# Patient Record
(demographics unavailable — no encounter records)

---

## 2025-04-24 NOTE — HEALTH RISK ASSESSMENT
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

## 2025-04-24 NOTE — HISTORY OF PRESENT ILLNESS
[de-identified] : Patient comes for his comprehensive physical examination and followup of his chronic medical problems.Patient feels well without any chest pain, shortness of breath, paroxysmal nocturnal dyspnea, orthopnea or bleeding episodes

## 2025-04-24 NOTE — ASSESSMENT
[FreeTextEntry1] : Diagnoses #1 history of paroxysmal atrial fibrillation, stable.  Followed up by the cardiology. #2 hypertension, Continue same treatment and low salt diet.cmp to lab to check kidney function and electrolytes. #3 hyperlipidemia. Continue with low-fat diet.lipids to lab #4 hypovitaminosis D., being supplemented.level to lab #5 benign prostatic hypertrophy.stable .psa to lab #6 obstructive sleep apnea. Patient refused to use mask because he cannot tolerate it during  his sleep.  Advised to try to lose weight by cutting back on his calories # 7 gastritis GE reflux, stable.cbc to lab.  Continue current treatment and diet 8.  Episodes of vertigo.  Only occasionally symptomatic. 9.  Obesity.  Again advised to try to lose weight by cutting back on his calories and increasing his activity.  Health benefits by doing that explained to him.  Approximately 15 minutes spent Plan..  Since I am retiring at the end of June refer to another primary care physician of [facility].  Return after 3 months for Dr Ezekiel Vasquez to follow-up with him

## 2025-07-21 NOTE — HISTORY OF PRESENT ILLNESS
[FreeTextEntry1] : Patient is present today for longitudinal care.  [de-identified] : Patient is a 59yr old M who is present today for longitudinal care.  -Patient presents for follow-up, denies any chest pain shortness of breath chest tightness.  Does have some knee pain attempting to exercise.  Denies any reflux palpitations.Has vertigo which has occurred before occurs when turning head suddenly last for few seconds.  Has been doing exercises at home

## 2025-07-21 NOTE — ASSESSMENT
[FreeTextEntry1] : Follow up visit: Benign essential HTN, Benign paroxysmal positional vertigo (unspecified laterality), BPH, Mixed HLD - BP (134/87). Continue current management. - Check A1c, CBC, CMP, Lipid Profile, TSH, Urinalysis, Vitamin levels - Order Prostate Ca Screen (PSA Total)  --Continue with exercises for benign vertigo. - RTO in 3 months.   Pt verbalized understanding and will reach out should any questions/concerns occur.

## 2025-07-21 NOTE — HEALTH RISK ASSESSMENT
[Yes] : Yes [No] : In the past 12 months have you used drugs other than those required for medical reasons? No [Current] : Current [de-identified] : social

## 2025-07-21 NOTE — ADDENDUM
[FreeTextEntry1] :  I, Viv Soto, acted as a scribe on behalf of Dr. Ezekiel Vasquez MD, on 07/21/2025.  All medical entries made by the scribe were at my, Dr. Ezekiel Vasquez MD, direction and personally dictated by me on 07/21/2025. I have reviewed the chart and agree that the record accurately reflects my personal performance of the history, physical exam, assessment and plan. I have also personally directed, reviewed, and agreed with the chart.

## 2025-07-21 NOTE — PHYSICAL EXAM
[Normal] : soft, non-tender, non-distended, no masses palpated, no HSM and normal bowel sounds [de-identified] : Tenderness over the medial joint lines bilaterally